# Patient Record
Sex: MALE | Race: WHITE | NOT HISPANIC OR LATINO | Employment: FULL TIME | URBAN - METROPOLITAN AREA
[De-identification: names, ages, dates, MRNs, and addresses within clinical notes are randomized per-mention and may not be internally consistent; named-entity substitution may affect disease eponyms.]

---

## 2018-02-06 ENCOUNTER — OFFICE VISIT (OUTPATIENT)
Dept: RETAIL CLINIC | Facility: CLINIC | Age: 28
End: 2018-02-06

## 2018-02-06 VITALS
HEART RATE: 86 BPM | RESPIRATION RATE: 16 BRPM | TEMPERATURE: 98.4 F | DIASTOLIC BLOOD PRESSURE: 90 MMHG | SYSTOLIC BLOOD PRESSURE: 118 MMHG | OXYGEN SATURATION: 98 %

## 2018-02-06 DIAGNOSIS — R05.9 COUGHING: Primary | ICD-10-CM

## 2018-02-06 LAB
EXPIRATION DATE: NORMAL
FLUAV AG NPH QL: NEGATIVE
FLUBV AG NPH QL: NEGATIVE
INTERNAL CONTROL: NORMAL
Lab: NORMAL

## 2018-02-06 PROCEDURE — 99203 OFFICE O/P NEW LOW 30 MIN: CPT | Performed by: NURSE PRACTITIONER

## 2018-02-06 PROCEDURE — 87804 INFLUENZA ASSAY W/OPTIC: CPT | Performed by: NURSE PRACTITIONER

## 2018-02-06 RX ORDER — METHYLPREDNISOLONE 4 MG/1
TABLET ORAL
Qty: 1 EACH | Refills: 0 | Status: SHIPPED | OUTPATIENT
Start: 2018-02-06

## 2018-02-06 RX ORDER — OMEPRAZOLE 20 MG/1
20 CAPSULE, DELAYED RELEASE ORAL DAILY
Qty: 30 CAPSULE | Refills: 0 | Status: SHIPPED | OUTPATIENT
Start: 2018-02-06

## 2018-02-06 RX ORDER — BENZONATATE 100 MG/1
100 CAPSULE ORAL 3 TIMES DAILY PRN
Qty: 15 CAPSULE | Refills: 0 | Status: SHIPPED | OUTPATIENT
Start: 2018-02-06

## 2018-02-06 RX ORDER — ALBUTEROL SULFATE 90 UG/1
2 AEROSOL, METERED RESPIRATORY (INHALATION) EVERY 4 HOURS PRN
COMMUNITY

## 2018-02-06 NOTE — PROGRESS NOTES
Subjective   Patient ID: Delano Pang is a 27 y.o. male presents with   Chief Complaint   Patient presents with   • Cough     x 3 months    • Nasal Congestion   • Flu Symptoms       HPI Comments: Patient states nasal congestion, drainage, and headache started 24 hours ago.  Denies body aches, fever or chills.    Cough   This is a new problem. The current episode started more than 1 month ago (3 months). The problem has been unchanged. The problem occurs every few hours. The cough is non-productive. Pertinent negatives include no chest pain, chills, ear congestion, ear pain, fever, headaches, heartburn, hemoptysis, myalgias, nasal congestion, postnasal drip, rash, rhinorrhea, sore throat, shortness of breath or wheezing.       No Known Allergies    The following portions of the patient's history were reviewed and updated as appropriate: allergies, current medications, past family history, past medical history, past social history, past surgical history and problem list.      Review of Systems   Constitutional: Negative for chills and fever.   HENT: Positive for congestion. Negative for ear pain, postnasal drip, rhinorrhea, sinus pain, sinus pressure, sneezing and sore throat.    Respiratory: Positive for cough. Negative for hemoptysis, chest tightness, shortness of breath and wheezing.    Cardiovascular: Negative for chest pain.   Gastrointestinal: Negative for diarrhea, heartburn, nausea and vomiting.   Musculoskeletal: Negative for myalgias.   Skin: Negative for rash.   Neurological: Negative for headaches.       Objective     Vitals:    02/06/18 1748   BP: 118/90   Pulse: 86   Resp: 16   Temp: 98.4 °F (36.9 °C)   SpO2: 98%         Physical Exam   Constitutional: He is oriented to person, place, and time. He appears well-developed and well-nourished. He does not appear ill. No distress.   HENT:   Head: Normocephalic.   Right Ear: Hearing, tympanic membrane, external ear and ear canal normal.   Left Ear: Hearing,  tympanic membrane, external ear and ear canal normal.   Nose: Nose normal. No rhinorrhea or sinus tenderness.   Mouth/Throat: Oropharynx is clear and moist and mucous membranes are normal. Tonsils are 2+ on the right. Tonsils are 2+ on the left. No tonsillar exudate.   Eyes: Conjunctivae are normal.   Sclera white.   Neck: No tracheal deviation present.   Cardiovascular: Normal rate, regular rhythm, S1 normal, S2 normal and normal heart sounds.    Pulmonary/Chest: Effort normal and breath sounds normal. No accessory muscle usage. No respiratory distress.   Abdominal: Soft. Bowel sounds are normal. There is no tenderness.   Lymphadenopathy:     He has no cervical adenopathy.   Neurological: He is alert and oriented to person, place, and time.   Skin: Skin is warm and dry.   Vitals reviewed.        Delano was seen today for cough, nasal congestion and flu symptoms.    Diagnoses and all orders for this visit:    Coughing  -     MethylPREDNISolone (MEDROL, BRANDO,) 4 MG tablet; Take as directed on package instructions.  -     benzonatate (TESSALON) 100 MG capsule; Take 1 capsule by mouth 3 (Three) Times a Day As Needed for Cough.  -     omeprazole (PRILOSEC) 20 MG capsule; Take 1 capsule by mouth Daily.  -     POC Influenza A / B      Meds as prescribed.  Schedule appointment with PCP for asthma/GERD workup.  To [facility] for any worsening.  Mucinex or Stahist for congestion  Follow-up with Primary Care Physician in 48-72 hours if these symptoms worsen or fail to improve as anticipated. Patient verbalizes understanding.

## 2018-02-06 NOTE — PATIENT INSTRUCTIONS

## 2019-03-23 ENCOUNTER — ON CAMPUS - OUTPATIENT (AMBULATORY)
Dept: URBAN - METROPOLITAN AREA HOSPITAL 108 | Facility: HOSPITAL | Age: 29
End: 2019-03-23

## 2019-03-23 DIAGNOSIS — K22.2 ESOPHAGEAL OBSTRUCTION: ICD-10-CM

## 2019-03-23 DIAGNOSIS — R13.10 DYSPHAGIA, UNSPECIFIED: ICD-10-CM

## 2019-03-23 DIAGNOSIS — K20.0 EOSINOPHILIC ESOPHAGITIS: ICD-10-CM

## 2019-03-23 PROCEDURE — 43235 EGD DIAGNOSTIC BRUSH WASH: CPT | Performed by: INTERNAL MEDICINE

## 2019-05-09 VITALS
SYSTOLIC BLOOD PRESSURE: 120 MMHG | RESPIRATION RATE: 31 BRPM | SYSTOLIC BLOOD PRESSURE: 118 MMHG | OXYGEN SATURATION: 100 % | DIASTOLIC BLOOD PRESSURE: 76 MMHG | HEART RATE: 82 BPM | RESPIRATION RATE: 24 BRPM | TEMPERATURE: 97.2 F | SYSTOLIC BLOOD PRESSURE: 135 MMHG | DIASTOLIC BLOOD PRESSURE: 62 MMHG | HEART RATE: 73 BPM | DIASTOLIC BLOOD PRESSURE: 72 MMHG | SYSTOLIC BLOOD PRESSURE: 126 MMHG | OXYGEN SATURATION: 99 % | HEIGHT: 71 IN | HEART RATE: 88 BPM | OXYGEN SATURATION: 96 % | DIASTOLIC BLOOD PRESSURE: 74 MMHG | DIASTOLIC BLOOD PRESSURE: 68 MMHG | HEART RATE: 85 BPM | OXYGEN SATURATION: 97 % | RESPIRATION RATE: 16 BRPM | HEART RATE: 67 BPM | DIASTOLIC BLOOD PRESSURE: 90 MMHG | RESPIRATION RATE: 21 BRPM | OXYGEN SATURATION: 98 % | TEMPERATURE: 98.6 F | HEART RATE: 80 BPM | SYSTOLIC BLOOD PRESSURE: 122 MMHG | WEIGHT: 215 LBS | HEART RATE: 72 BPM | DIASTOLIC BLOOD PRESSURE: 75 MMHG | RESPIRATION RATE: 22 BRPM | SYSTOLIC BLOOD PRESSURE: 130 MMHG

## 2019-05-10 ENCOUNTER — OFFICE (AMBULATORY)
Dept: URBAN - METROPOLITAN AREA PATHOLOGY 4 | Facility: PATHOLOGY | Age: 29
End: 2019-05-10
Payer: COMMERCIAL

## 2019-05-10 ENCOUNTER — AMBULATORY SURGICAL CENTER (AMBULATORY)
Dept: URBAN - METROPOLITAN AREA SURGERY 17 | Facility: SURGERY | Age: 29
End: 2019-05-10

## 2019-05-10 DIAGNOSIS — K29.50 UNSPECIFIED CHRONIC GASTRITIS WITHOUT BLEEDING: ICD-10-CM

## 2019-05-10 DIAGNOSIS — K20.0 EOSINOPHILIC ESOPHAGITIS: ICD-10-CM

## 2019-05-10 DIAGNOSIS — R13.10 DYSPHAGIA, UNSPECIFIED: ICD-10-CM

## 2019-05-10 PROBLEM — K29.70 GASTRITIS, UNSPECIFIED, WITHOUT BLEEDING: Status: ACTIVE | Noted: 2019-05-10

## 2019-05-10 LAB
GI HISTOLOGY: A. UNSPECIFIED: (no result)
GI HISTOLOGY: B. UNSPECIFIED: (no result)
GI HISTOLOGY: C. UNSPECIFIED: (no result)
GI HISTOLOGY: PDF REPORT: (no result)

## 2019-05-10 PROCEDURE — 43239 EGD BIOPSY SINGLE/MULTIPLE: CPT | Performed by: INTERNAL MEDICINE

## 2019-05-10 PROCEDURE — 88305 TISSUE EXAM BY PATHOLOGIST: CPT | Performed by: INTERNAL MEDICINE

## 2019-08-13 ENCOUNTER — OFFICE (AMBULATORY)
Dept: URBAN - METROPOLITAN AREA CLINIC 75 | Facility: CLINIC | Age: 29
End: 2019-08-13

## 2019-08-13 VITALS
DIASTOLIC BLOOD PRESSURE: 84 MMHG | WEIGHT: 229 LBS | SYSTOLIC BLOOD PRESSURE: 126 MMHG | RESPIRATION RATE: 17 BRPM | HEART RATE: 86 BPM | HEIGHT: 71 IN

## 2019-08-13 DIAGNOSIS — K20.0 EOSINOPHILIC ESOPHAGITIS: ICD-10-CM

## 2019-08-13 PROCEDURE — 99213 OFFICE O/P EST LOW 20 MIN: CPT | Performed by: INTERNAL MEDICINE

## 2021-04-16 ENCOUNTER — BULK ORDERING (OUTPATIENT)
Dept: CASE MANAGEMENT | Facility: OTHER | Age: 31
End: 2021-04-16

## 2021-04-16 DIAGNOSIS — Z23 IMMUNIZATION DUE: ICD-10-CM
